# Patient Record
(demographics unavailable — no encounter records)

---

## 2025-03-21 NOTE — HEALTH RISK ASSESSMENT
[Very Good] : ~his/her~  mood as very good [No] : No [No falls in past year] : Patient reported no falls in the past year [0] : 2) Feeling down, depressed, or hopeless: Not at all (0) [Patient reported mammogram was normal] : Patient reported mammogram was normal [Patient reported PAP Smear was normal] : Patient reported PAP Smear was normal [Former] : Former [PHQ-2 Negative - No further assessment needed] : PHQ-2 Negative - No further assessment needed [Employed] : employed [Significant Other] : lives with significant other [Sexually Active] : sexually active [Fully functional (bathing, dressing, toileting, transferring, walking, feeding)] : Fully functional (bathing, dressing, toileting, transferring, walking, feeding) [Fully functional (using the telephone, shopping, preparing meals, housekeeping, doing laundry, using] : Fully functional and needs no help or supervision to perform IADLs (using the telephone, shopping, preparing meals, housekeeping, doing laundry, using transportation, managing medications and managing finances) [0-4] : 0-4 [> 15 Years] : > 15 Years [de-identified] : work out 6 days a week and hike. [[id number]] : 0 [Change in mental status noted] : No change in mental status noted [Reports changes in hearing] : Reports no changes in hearing [Reports changes in vision] : Reports no changes in vision [MammogramDate] : 2024 [MammogramComments] : dense press tissue [PapSmearDate] : 2023 [ColonoscopyDate] : none [HIVDate] : 04/2019 [HIVComments] : negative [HepatitisCDate] : 04/2019 [HepatitisCComments] : negative [de-identified] : quit 24 years ago

## 2025-03-21 NOTE — ASSESSMENT
[FreeTextEntry1] : 44 year old female here for annual exam. Routine labs drawn in office. Ordered mammogram.  Pap up to date. Due for cancer screening at age 45. Reviewed colonoscopy vs Cologuard and she prefers Cologuard as does not want anesthesia.  Derm referral for skin cancer screening.  Tdap up to date.  Reviewed healthy lifestyle habits.   Weight management - plan to increase phentermine to 37.5mg daily.

## 2025-03-21 NOTE — HISTORY OF PRESENT ILLNESS
[de-identified] : 44 year old female here for annual exam. States since last visit her weight has stabilized. She is exercising regularly and thinks gained few pounds of muscle.  She desires to make medication changes to assist with better weight.  She is due for annual screening mammogram. No acute breast changes.

## 2025-04-30 NOTE — HISTORY OF PRESENT ILLNESS
[FreeTextEntry8] : 44-year-old female here with acute concern regarding STI. HIV negative 3/21/2025. Hep C negative 4/2019. No known history of STI.  Patient reports her  had a growth on right shin and was advised for her to follow-up with PCP for STI testing. No further information about her 's abnormal skin lesion available. Reports history of HPV positive in early 20s but subsequently resolved over the past 15 years.  Has repeat Pap scheduled later this month.